# Patient Record
Sex: FEMALE | NOT HISPANIC OR LATINO | Employment: FULL TIME | URBAN - METROPOLITAN AREA
[De-identification: names, ages, dates, MRNs, and addresses within clinical notes are randomized per-mention and may not be internally consistent; named-entity substitution may affect disease eponyms.]

---

## 2024-03-11 ENCOUNTER — APPOINTMENT (OUTPATIENT)
Dept: OBSTETRICS AND GYNECOLOGY | Facility: CLINIC | Age: 24
End: 2024-03-11
Payer: COMMERCIAL

## 2024-03-18 ENCOUNTER — APPOINTMENT (OUTPATIENT)
Dept: OBSTETRICS AND GYNECOLOGY | Facility: CLINIC | Age: 24
End: 2024-03-18
Payer: COMMERCIAL

## 2024-04-01 ENCOUNTER — OFFICE VISIT (OUTPATIENT)
Dept: OBSTETRICS AND GYNECOLOGY | Facility: CLINIC | Age: 24
End: 2024-04-01
Payer: COMMERCIAL

## 2024-04-01 VITALS — WEIGHT: 186.7 LBS | DIASTOLIC BLOOD PRESSURE: 84 MMHG | SYSTOLIC BLOOD PRESSURE: 132 MMHG

## 2024-04-01 DIAGNOSIS — Z01.419 ENCOUNTER FOR ANNUAL ROUTINE GYNECOLOGICAL EXAMINATION: Primary | ICD-10-CM

## 2024-04-01 PROCEDURE — 99385 PREV VISIT NEW AGE 18-39: CPT

## 2024-04-01 PROCEDURE — 1036F TOBACCO NON-USER: CPT

## 2024-04-01 PROCEDURE — 88175 CYTOPATH C/V AUTO FLUID REDO: CPT | Mod: TC,GCY,WESLAB

## 2024-04-01 ASSESSMENT — ENCOUNTER SYMPTOMS
LOSS OF SENSATION IN FEET: 0
CHILLS: 0
OCCASIONAL FEELINGS OF UNSTEADINESS: 0
DYSURIA: 0
VOMITING: 0
ABDOMINAL PAIN: 0
DEPRESSION: 0
UNEXPECTED WEIGHT CHANGE: 0
DIZZINESS: 0
NAUSEA: 0
FEVER: 0
SHORTNESS OF BREATH: 0
FATIGUE: 0
HEADACHES: 0
COLOR CHANGE: 0
COUGH: 0

## 2024-04-01 ASSESSMENT — PATIENT HEALTH QUESTIONNAIRE - PHQ9
2. FEELING DOWN, DEPRESSED OR HOPELESS: NOT AT ALL
SUM OF ALL RESPONSES TO PHQ9 QUESTIONS 1 & 2: 0
1. LITTLE INTEREST OR PLEASURE IN DOING THINGS: NOT AT ALL

## 2024-04-01 ASSESSMENT — PAIN SCALES - GENERAL: PAINLEVEL: 0-NO PAIN

## 2024-04-01 NOTE — PROGRESS NOTES
Subjective   Aida Yeung is a 23 y.o. female who is here for a routine GYN exam as a new patient. Prior care at [facility]. Overall doing well; no concerns or complaints. Menses currently regular, once monthly, 6-7 days duration, first 1-2 days heavier, with cramps and sometimes breast tenderness, but manageable. Denies breast or vaginal concerns today.    Complaints:   none  Periods: regular  Dysmenorrhea:  none    Current contraception: condoms  History of abnormal Pap smear: no  History of abnormal mammogram: no      OB History          0    Para   0    Term   0       0    AB   0    Living   0         SAB   0    IAB   0    Ectopic   0    Multiple   0    Live Births   0                  Review of Systems   Constitutional:  Negative for chills, fatigue, fever and unexpected weight change.   Respiratory:  Negative for cough and shortness of breath.    Gastrointestinal:  Negative for abdominal pain, nausea and vomiting.   Genitourinary:  Negative for dyspareunia, dysuria, pelvic pain and vaginal discharge.   Skin:  Negative for color change and rash.   Neurological:  Negative for dizziness and headaches.       Objective   /84   Wt 84.7 kg (186 lb 11.2 oz)   LMP 2024        General:   Alert and oriented, in no acute distress   Neck: Supple. No visible thyromegaly.    Breast/Axilla: Normal to palpation bilaterally without masses, skin changes, or nipple discharge.    Abdomen: Soft, non-tender, without masses or organomegaly   Vulva: Normal architecture without erythema, masses, or lesions.    Vagina: Normal mucosa without lesions, masses, or atrophy. No abnormal vaginal discharge.    Cervix: Normal without masses, lesions, or signs of cervicitis; pap performed    Uterus: Normal, mobile, non-enlarged uterus   Adnexa: Normal without masses or lesions   Pelvic Floor Normal    Psych Normal affect. Normal mood.      Assessment/Plan   -Due for pap smear as a new patient, obtained.  -Continue  monitoring and tracking menstrual cycles.  -Rvwd safe sex practices.     Annamaria Dawkins PA-C

## 2024-04-10 ENCOUNTER — OFFICE VISIT (OUTPATIENT)
Dept: DERMATOLOGY | Facility: CLINIC | Age: 24
End: 2024-04-10
Payer: COMMERCIAL

## 2024-04-10 DIAGNOSIS — R20.9 DISTURBANCE OF SKIN SENSATION: ICD-10-CM

## 2024-04-10 DIAGNOSIS — L73.9 FOLLICULITIS: ICD-10-CM

## 2024-04-10 DIAGNOSIS — L91.0 HYPERTROPHIC SCAR: ICD-10-CM

## 2024-04-10 DIAGNOSIS — D22.9 MULTIPLE BENIGN MELANOCYTIC NEVI: Primary | ICD-10-CM

## 2024-04-10 PROCEDURE — 11900 INJECT SKIN LESIONS </W 7: CPT

## 2024-04-10 PROCEDURE — 99203 OFFICE O/P NEW LOW 30 MIN: CPT | Performed by: DERMATOLOGY

## 2024-04-10 PROCEDURE — 1036F TOBACCO NON-USER: CPT | Performed by: DERMATOLOGY

## 2024-04-10 ASSESSMENT — DERMATOLOGY PATIENT ASSESSMENT
ARE YOU ON BIRTH CONTROL: NO
ARE YOU TRYING TO GET PREGNANT: NO
DO YOU USE A TANNING BED: NO
DO YOU HAVE ANY NEW OR CHANGING LESIONS: NO
DO YOU HAVE IRREGULAR MENSTRUAL CYCLES: YES
HAVE YOU HAD OR DO YOU HAVE VASCULAR DISEASE: NO
DO YOU USE SUNSCREEN: OCCASIONALLY
ARE YOU AN ORGAN TRANSPLANT RECIPIENT: NO
HAVE YOU HAD OR DO YOU HAVE A STAPH INFECTION: NO

## 2024-04-10 ASSESSMENT — DERMATOLOGY QUALITY OF LIFE (QOL) ASSESSMENT
RATE HOW EMOTIONALLY BOTHERED YOU ARE BY YOUR SKIN PROBLEM (FOR EXAMPLE, WORRY, EMBARRASSMENT, FRUSTRATION): 6 - ALWAYS BOTHERED
RATE HOW BOTHERED YOU ARE BY EFFECTS OF YOUR SKIN PROBLEMS ON YOUR ACTIVITIES (EG, GOING OUT, ACCOMPLISHING WHAT YOU WANT, WORK ACTIVITIES OR YOUR RELATIONSHIPS WITH OTHERS): 2
RATE HOW BOTHERED YOU ARE BY SYMPTOMS OF YOUR SKIN PROBLEM (EG, ITCHING, STINGING BURNING, HURTING OR SKIN IRRITATION): 0 - NEVER BOTHERED
RATE HOW EMOTIONALLY BOTHERED YOU ARE BY YOUR SKIN PROBLEM (FOR EXAMPLE, WORRY, EMBARRASSMENT, FRUSTRATION): 0 - NEVER BOTHERED
RATE HOW BOTHERED YOU ARE BY EFFECTS OF YOUR SKIN PROBLEMS ON YOUR ACTIVITIES (EG, GOING OUT, ACCOMPLISHING WHAT YOU WANT, WORK ACTIVITIES OR YOUR RELATIONSHIPS WITH OTHERS): 0 - NEVER BOTHERED
RATE HOW BOTHERED YOU ARE BY EFFECTS OF YOUR SKIN PROBLEMS ON YOUR ACTIVITIES (EG, GOING OUT, ACCOMPLISHING WHAT YOU WANT, WORK ACTIVITIES OR YOUR RELATIONSHIPS WITH OTHERS): 2
WHAT SINGLE SKIN CONDITION LISTED BELOW IS THE PATIENT ANSWERING THE QUALITY-OF-LIFE ASSESSMENT QUESTIONS ABOUT: ACNE
WHAT SINGLE SKIN CONDITION LISTED BELOW IS THE PATIENT ANSWERING THE QUALITY-OF-LIFE ASSESSMENT QUESTIONS ABOUT: ACNE
DATE THE QUALITY-OF-LIFE ASSESSMENT WAS COMPLETED: 66940
ARE THERE EXCLUSIONS OR EXCEPTIONS FOR THE QUALITY OF LIFE ASSESSMENT: NO
RATE HOW EMOTIONALLY BOTHERED YOU ARE BY YOUR SKIN PROBLEM (FOR EXAMPLE, WORRY, EMBARRASSMENT, FRUSTRATION): 6 - ALWAYS BOTHERED

## 2024-04-10 ASSESSMENT — ITCH NUMERIC RATING SCALE: HOW SEVERE IS YOUR ITCHING?: 0

## 2024-04-10 ASSESSMENT — PATIENT GLOBAL ASSESSMENT (PGA)
WHAT IS THE PGA: PATIENT GLOBAL ASSESSMENT:  2 - MILD
PATIENT GLOBAL ASSESSMENT: PATIENT GLOBAL ASSESSMENT:  1 - CLEAR

## 2024-04-10 NOTE — PROGRESS NOTES
Subjective     Aida Yeung is a 23 y.o. female who presents for the following: Skin Check. Patient denies personal or family history of skin cancer. She has two lesions of concern today one on her superior mid-chest and another on her central lower back. The lesion on her superior mid-chest has been present for several months. It appeared after she was traveling. She is interested in discussing treatment/removal options today. The lesion on her central lower back has been present for many years. She thinks it may be a mole and is worried that it will catch on her clothing.     Patient also reports painful red bumps on her chest and back.       Review of Systems:  No other skin or systemic complaints other than what is documented elsewhere in the note.    The following portions of the chart were reviewed this encounter and updated as appropriate:       Specialty Problems    None    Past Medical History:  Aida Yeung  has no past medical history on file.    Past Surgical History:  Aida Yeung  has no past surgical history on file.    Family History:  Patient family history includes Diabetes in her father; Hypertension in her mother.    Social History:  Aida Yeung  reports that she has never smoked. She has never used smokeless tobacco. She reports current alcohol use. She reports that she does not use drugs.    Allergies:  Patient has no known allergies.    Current Medications / CAM's:  No current outpatient medications on file.  No current facility-administered medications for this visit.     Objective   Well appearing patient in no apparent distress; mood and affect are within normal limits.    A full examination was performed including scalp, head, eyes, ears, nose, lips, neck, chest, axillae, abdomen, back, buttocks, bilateral upper extremities, bilateral lower extremities, hands, feet, fingers, toes, fingernails, and toenails. All findings within normal limits unless otherwise noted below. Patient  declined genital and gluteal cleft exam.     - scattered regular brown macules and papules  - on the central lower back there is a brown pigmented papule    Chest (Upper Torso, Anterior), Torso - Posterior (Back)  Scattered on the patient's chest and back, there are several follicular-based erythematous, inflammatory papules and pustules      Chest - Medial (Center)  On the central upper chest, there is a erythematous papule.       Assessment/Plan   Multiple benign melanocytic nevi    Benign melanocytic nevi  - Discussed benign nature and that no treatment is necessary unless it becomes painful or increases in size. Patient opts for clinical monitoring at this time.    - Sun protective behavior reviewed and encouraged including the use of over-the-counter sunscreen with SPF30+ daily (reapply every 1.5 hours when outdoors), UPF clothing, broad rimmed hats, sunglasses, and avoidance of midday sun. Home skin monitoring encouraged and how to monitor for skin cancer (changing or new moles, new rapidly growing or non-healing lesions) reviewed. Patient encouraged to call with interval concerns or changes.      Folliculitis  Chest (Upper Torso, Anterior); Torso - Posterior (Back)    Folliculitis/acne with mild scarring, chest and back  -The bacterial nature of this condition and treatment options were discussed.  -Start OTC BPO 10% wash daily as needed. Advised patient to let sit on skin for 5-10 minutes before washing.   -Provided written recommendation for OTC BPO  - consider adding clindamycin in future if worsens/persists-- this seems to be an acute issue from recent travel to [place], so may calm with time and benzoyl peroxide         Related Procedures  Follow Up In Dermatology - Established Patient    Hypertrophic scar  Chest - Medial (Center)    -Likely 2/2 acne scarring.   -Discussed treatment options including monitoring vs ILK.   -After discussion of risks and benefits, patient opts for ILK.       Intralesional  injection - Chest - Medial (Center)  Intralesional Injection:   Date/Time: 4/10/2024 2:32 PM    Consent:     Consent obtained:  Verbal    Consent given by:  Patient    Risks discussed:  Poor cosmetic result, pain, infection and bleeding    Alternatives discussed:  No treatment  Pre-Procedure Details:     Prep Type:  Isopropyl alcohol  Procedure Details:   Injection:  Triamcinolone  Outcome: patient tolerated procedure well  Post-procedure details: sterile dressing applied and wound care instructions given  Comments:  A total of 0.5 ml of 10 mg/mL Kenalog were injected into 1 lesion(s)  Lot #: [id number]  Expiration: 12/2025    Related Medications  triamcinolone acetonide (Kenalog) injection 5 mg      Disturbance of skin sensation       RTC in 8 weeks for re-check of hypertrophic scarring/repeat ILK and 1-2 years for FBSE.     Gayle Miranda DO

## 2024-04-11 LAB
CYTOLOGY CMNT CVX/VAG CYTO-IMP: NORMAL
LAB AP HPV GENOTYPE QUESTION: YES
LAB AP HPV HR: NORMAL
LABORATORY COMMENT REPORT: NORMAL
LMP START DATE: NORMAL
PATH REPORT.TOTAL CANCER: NORMAL

## 2024-06-13 ASSESSMENT — DERMATOLOGY QUALITY OF LIFE (QOL) ASSESSMENT
RATE HOW BOTHERED YOU ARE BY EFFECTS OF YOUR SKIN PROBLEMS ON YOUR ACTIVITIES (EG, GOING OUT, ACCOMPLISHING WHAT YOU WANT, WORK ACTIVITIES OR YOUR RELATIONSHIPS WITH OTHERS): 4
RATE HOW EMOTIONALLY BOTHERED YOU ARE BY YOUR SKIN PROBLEM (FOR EXAMPLE, WORRY, EMBARRASSMENT, FRUSTRATION): 6 - ALWAYS BOTHERED
RATE HOW BOTHERED YOU ARE BY SYMPTOMS OF YOUR SKIN PROBLEM (EG, ITCHING, STINGING BURNING, HURTING OR SKIN IRRITATION): 2
RATE HOW BOTHERED YOU ARE BY SYMPTOMS OF YOUR SKIN PROBLEM (EG, ITCHING, STINGING BURNING, HURTING OR SKIN IRRITATION): 2
RATE HOW BOTHERED YOU ARE BY EFFECTS OF YOUR SKIN PROBLEMS ON YOUR ACTIVITIES (EG, GOING OUT, ACCOMPLISHING WHAT YOU WANT, WORK ACTIVITIES OR YOUR RELATIONSHIPS WITH OTHERS): 4
RATE HOW EMOTIONALLY BOTHERED YOU ARE BY YOUR SKIN PROBLEM (FOR EXAMPLE, WORRY, EMBARRASSMENT, FRUSTRATION): 6 - ALWAYS BOTHERED

## 2024-06-13 ASSESSMENT — PATIENT GLOBAL ASSESSMENT (PGA): WHAT IS THE PGA: PATIENT GLOBAL ASSESSMENT:  3 - MODERATE

## 2024-06-14 ENCOUNTER — APPOINTMENT (OUTPATIENT)
Dept: DERMATOLOGY | Facility: CLINIC | Age: 24
End: 2024-06-14
Payer: COMMERCIAL

## 2024-06-14 DIAGNOSIS — L91.0 HYPERTROPHIC SCAR: ICD-10-CM

## 2024-06-14 DIAGNOSIS — L70.0 ACNE VULGARIS: Primary | ICD-10-CM

## 2024-06-14 DIAGNOSIS — L73.9 FOLLICULITIS: ICD-10-CM

## 2024-06-14 PROCEDURE — 99214 OFFICE O/P EST MOD 30 MIN: CPT | Performed by: DERMATOLOGY

## 2024-06-14 RX ORDER — CLINDAMYCIN PHOSPHATE 10 MG/ML
SOLUTION TOPICAL
Qty: 60 EACH | Refills: 11 | Status: SHIPPED | OUTPATIENT
Start: 2024-06-14

## 2024-06-14 NOTE — PROGRESS NOTES
Subjective     Aida Yeung is a 23 y.o. female who presents for the following: Acne.     LOV 4/2024 started BPO 10% wash; has a hyptertrophic scar on chest as well that we did one round of ILK last visit.     Skin Cancer History  No skin cancer on file.      Review of Systems:  No other skin or systemic complaints other than what is documented elsewhere in the note.    The following portions of the chart were reviewed this encounter and updated as appropriate:       Specialty Problems    None    Past Medical History:  Aida Yeung  has no past medical history on file.    Past Surgical History:  Aida Yeung  has no past surgical history on file.    Family History:  Patient family history includes Diabetes in her father; Hypertension in her mother.    Social History:  Aida Yeung  reports that she has never smoked. She has never used smokeless tobacco. She reports current alcohol use. She reports that she does not use drugs.    Allergies:  Patient has no known allergies.    Current Medications / CAM's:    Current Outpatient Medications:     clindamycin (Cleocin T) 1 % swab, Apply to breakout prone areas daily after washing with benzoyl peroxide, Disp: 60 each, Rfl: 11     Objective   Well appearing patient in no apparent distress; mood and affect are within normal limits.    A focused exam:     Chest - Medial (Center), Head - Anterior (Face), Left Breast, Left Upper Back, Neck - Posterior, Right Breast, Right Upper Back  Scattered small red papules on chest, upper back/shoulders, face    Chest - Medial (Center)  4mm red soft papule mid chest         Assessment/Plan   Acne vulgaris  Head - Anterior (Face); Neck - Posterior; Chest - Medial (Center); Left Breast; Right Breast; Left Upper Back; Right Upper Back    Acne- improved on benzoyl peroxide 10% otc past 2  months, but still with some breakouts/not at treatment goal  - ADD clindamycin 1%, her insurance prefers swabs. Apply to breakout prone areas daily  after washing with benzoyl peroxide     Related Medications  clindamycin (Cleocin T) 1 % swab  Apply to breakout prone areas daily after washing with benzoyl peroxide    Folliculitis    Related Procedures  Follow Up In Dermatology - Established Patient    Hypertrophic scar  Chest - Medial (Center)    Hypertrophic scar mid chest- improved after ilk 10mg/mL at last visit- soft today, but erythematous.   - bothered by redness. She notes that she'll be outdoors/traveling this summer so will defer vbeam for the summer and return in Oct/Nov when she feels she'll be able to do strict pre/post VBEAM UV protection.       Related Procedures  Follow Up In Dermatology - Established Patient          Fuv 4-5 mos for vbeam/ilk scar on chest and acne check-up; 15min ok    Sharlene Haq MD

## 2024-11-06 ENCOUNTER — APPOINTMENT (OUTPATIENT)
Dept: DERMATOLOGY | Facility: CLINIC | Age: 24
End: 2024-11-06
Payer: COMMERCIAL

## 2024-11-06 DIAGNOSIS — R20.9 DISTURBANCE OF SKIN SENSATION: Primary | ICD-10-CM

## 2024-11-06 DIAGNOSIS — L91.0 HYPERTROPHIC SCAR: ICD-10-CM

## 2024-11-06 PROCEDURE — 11900 INJECT SKIN LESIONS </W 7: CPT

## 2024-11-06 PROCEDURE — 17110 DESTRUCTION B9 LES UP TO 14: CPT

## 2024-11-06 PROCEDURE — 1036F TOBACCO NON-USER: CPT | Performed by: DERMATOLOGY

## 2024-11-06 RX ORDER — TRIAMCINOLONE ACETONIDE 40 MG/ML
20 INJECTION, SUSPENSION INTRA-ARTICULAR; INTRAMUSCULAR ONCE
Status: COMPLETED | OUTPATIENT
Start: 2024-11-06 | End: 2024-11-06

## 2024-11-06 ASSESSMENT — DERMATOLOGY PATIENT ASSESSMENT
ARE YOU TRYING TO GET PREGNANT: NO
HAVE YOU HAD OR DO YOU HAVE A STAPH INFECTION: NO
DO YOU HAVE ANY NEW OR CHANGING LESIONS: NO
DO YOU USE SUNSCREEN: OCCASIONALLY
HAVE YOU HAD OR DO YOU HAVE VASCULAR DISEASE: NO
DO YOU USE A TANNING BED: NO
ARE YOU ON BIRTH CONTROL: NO
DO YOU HAVE IRREGULAR MENSTRUAL CYCLES: NO
ARE YOU AN ORGAN TRANSPLANT RECIPIENT: NO

## 2024-11-06 ASSESSMENT — DERMATOLOGY QUALITY OF LIFE (QOL) ASSESSMENT
RATE HOW BOTHERED YOU ARE BY EFFECTS OF YOUR SKIN PROBLEMS ON YOUR ACTIVITIES (EG, GOING OUT, ACCOMPLISHING WHAT YOU WANT, WORK ACTIVITIES OR YOUR RELATIONSHIPS WITH OTHERS): 1
RATE HOW EMOTIONALLY BOTHERED YOU ARE BY YOUR SKIN PROBLEM (FOR EXAMPLE, WORRY, EMBARRASSMENT, FRUSTRATION): 3
ARE THERE EXCLUSIONS OR EXCEPTIONS FOR THE QUALITY OF LIFE ASSESSMENT: NO
DATE THE QUALITY-OF-LIFE ASSESSMENT WAS COMPLETED: 67150
RATE HOW BOTHERED YOU ARE BY SYMPTOMS OF YOUR SKIN PROBLEM (EG, ITCHING, STINGING BURNING, HURTING OR SKIN IRRITATION): 0 - NEVER BOTHERED
WHAT SINGLE SKIN CONDITION LISTED BELOW IS THE PATIENT ANSWERING THE QUALITY-OF-LIFE ASSESSMENT QUESTIONS ABOUT: KELOIDS

## 2024-11-06 ASSESSMENT — PATIENT GLOBAL ASSESSMENT (PGA): PATIENT GLOBAL ASSESSMENT: PATIENT GLOBAL ASSESSMENT:  1 - CLEAR

## 2024-11-06 ASSESSMENT — ITCH NUMERIC RATING SCALE: HOW SEVERE IS YOUR ITCHING?: 0

## 2024-11-06 NOTE — PROGRESS NOTES
Subjective     Aida Yeung is a 24 y.o. female who presents for the following: Scar (Hypertrophic scar on chest. ILK treatment.). She states she is still bothered by the appearance of the scar in terms of redness and texture.    Skin Cancer History  No skin cancer on file.      Review of Systems:  No other skin or systemic complaints other than what is documented elsewhere in the note.    The following portions of the chart were reviewed this encounter and updated as appropriate:       Specialty Problems    None    Past Medical History:  Aida Yeung  has no past medical history on file.    Past Surgical History:  iAda Yeung  has no past surgical history on file.    Family History:  Patient family history includes Diabetes in her father; Hypertension in her mother.    Social History:  Aida Yeung  reports that she has never smoked. She has never used smokeless tobacco. She reports current alcohol use. She reports that she does not use drugs.    Allergies:  Patient has no known allergies.    Current Medications / CAM's:    Current Outpatient Medications:     clindamycin (Cleocin T) 1 % swab, Apply to breakout prone areas daily after washing with benzoyl peroxide, Disp: 60 each, Rfl: 11  No current facility-administered medications for this visit.     Objective   Well appearing patient in no apparent distress; mood and affect are within normal limits.    A focused examination was performed of the chest. All findings within normal limits unless otherwise noted below.    Chest - Medial (Center)  4mm red soft papule mid chest         Assessment/Plan   Disturbance of skin sensation    Hypertrophic scar  Chest - Medial (Center)    Hypertrophic scar mid chest- improved after ilk 10mg/mL at 4/10/24 visit- soft today, but erythematous.   - bothered by redness and texture, discussed risk and benefits of VBEAM including treatment of surrounding telangiectasia. Advised need for treatment with intralesional steriod  injection incombination with the VBEAM to treat the lesion. Patient is agreeable and has signed paper VBEAM consent- reviewed in detail   -Counseled regarding risks and benefits of intralesional steroid injection following VBEAM, including skin atrophy, infection, bleeding, and poor cosmetic result  - reviewed the nature of telangiectasias and that the VBEAM laser targets the broken blood vessels and background redness of the scar. Reviewed that more than one treatment may be needed and background redness may not completely resolve with treatment.   - Risks of vascular laser treatment include but not limited to pain, redness, swelling, bruising, ulceration, scar, and dyspigmentation.   - Post-treatment: strictly avoid sunlight until healed.     Reviewed that we may be getting to close to as good as possible with the treatment option I have (vbeam, ilk); consider outsider referral for resurfacing or other scar revision pending her course and wishes.     Intralesional injection - Chest - Medial (Center)  Intralesional Injection:   Consent:     Consent obtained:  Verbal    Consent given by:  Patient    Risks discussed:  Poor cosmetic result, pain, infection and bleeding    Alternatives discussed:  No treatment  Pre-Procedure Details:     Prep Type:  Isopropyl alcohol  Procedure Details:   Injection:  Triamcinolone  Outcome: patient tolerated procedure well  Post-procedure details: sterile dressing applied and wound care instructions given  Dressing type: bandage   Comments:  A total of 0.05 ml of 20 mg/mL Kenalog were injected into 1 lesion(s)  Lot #: [id number]  Expiration: 4/1/26    Destr of lesion - Chest - Medial (Center)    Destruction method: laser removal    Timeout:  patient name, date of birth, surgical site, and procedure verified  Procedure prep:  Patient prepped in sterile fashion  Eye shield: adhesive eye shield    Laser type:  Vbeam  Treatment number:  1  Fluence (J/sq cm):  7  Spot size (mm):  7  Pulse  duration (ms):  1.5  Pulse stacking: Yes    Dynamic cooling: Yes    Lesion length (cm):  0.4  Lesion width (cm):  0.4  Outcome: patient tolerated procedure well with no complications      Related Procedures  Follow Up In Dermatology - Established Patient  Follow Up In Dermatology - Established Patient    Related Medications  triamcinolone acetonide (Kenalog-40) injection 20 mg         Return in 2-3 months for follow up, potential repeat V-Beam treatment/ILK injection.    Yamile Collins MD  Department of Dermatology    I was present for the entirety of the procedure(s).     Sharlene Haq MD

## 2024-12-20 ENCOUNTER — APPOINTMENT (OUTPATIENT)
Dept: OBSTETRICS AND GYNECOLOGY | Facility: CLINIC | Age: 24
End: 2024-12-20
Payer: COMMERCIAL

## 2024-12-20 VITALS
HEIGHT: 66 IN | SYSTOLIC BLOOD PRESSURE: 112 MMHG | WEIGHT: 199 LBS | BODY MASS INDEX: 31.98 KG/M2 | DIASTOLIC BLOOD PRESSURE: 64 MMHG

## 2024-12-20 DIAGNOSIS — Z01.419 WELL WOMAN EXAM WITH ROUTINE GYNECOLOGICAL EXAM: ICD-10-CM

## 2024-12-20 DIAGNOSIS — R10.2 PELVIC PAIN: ICD-10-CM

## 2024-12-20 DIAGNOSIS — N92.6 IRREGULAR PERIODS: ICD-10-CM

## 2024-12-20 LAB
POC APPEARANCE, URINE: CLEAR
POC BILIRUBIN, URINE: NEGATIVE
POC BLOOD, URINE: NEGATIVE
POC COLOR, URINE: YELLOW
POC GLUCOSE, URINE: NEGATIVE MG/DL
POC KETONES, URINE: NEGATIVE MG/DL
POC LEUKOCYTES, URINE: NEGATIVE
POC NITRITE,URINE: NEGATIVE
POC PH, URINE: 5 PH
POC PROTEIN, URINE: NEGATIVE MG/DL
POC SPECIFIC GRAVITY, URINE: 1.02
POC UROBILINOGEN, URINE: 0.2 EU/DL

## 2024-12-20 PROCEDURE — 1036F TOBACCO NON-USER: CPT | Performed by: OBSTETRICS & GYNECOLOGY

## 2024-12-20 PROCEDURE — 81003 URINALYSIS AUTO W/O SCOPE: CPT | Performed by: OBSTETRICS & GYNECOLOGY

## 2024-12-20 PROCEDURE — 99395 PREV VISIT EST AGE 18-39: CPT | Performed by: OBSTETRICS & GYNECOLOGY

## 2024-12-20 PROCEDURE — 3008F BODY MASS INDEX DOCD: CPT | Performed by: OBSTETRICS & GYNECOLOGY

## 2024-12-20 PROCEDURE — 87086 URINE CULTURE/COLONY COUNT: CPT

## 2024-12-20 ASSESSMENT — PAIN SCALES - GENERAL: PAINLEVEL_OUTOF10: 0-NO PAIN

## 2024-12-20 NOTE — PROGRESS NOTES
Subjective   Patient ID: Aida Yeung is a 24 y.o. female who presents for Well Women Visit (New Patient //Annual exam with pap smear //C/o  irregular periods//Chaperone declined).  HPI  As contained in the chief complaint  Review of Systems  10 systems have been reviewed and are negative and noncontributory to the patient current ailments.    Objective   Physical Exam  Vital signs reviewed    CONSTITUTIONAL- well nourished, well developed, looks like stated age.  She is sitting comfortably on the exam table in no apparent distress  SKIN- normal skin color and pigmentation no visible lesions  EYES- normal external exam  THYROID- symmetrical ,normal size and normal consistency  HEART- RRR without murmur, S3 or S4.  LUNGS- breathing comfortably, no dyspnea  EXTREMITIES- no deformities.  Edema, discoloration, or pain in BLE  NEUROLOGICAL- A/Ox3, conversational   PSYCHIATRIC- alert, pleasant and cordial, age-appropriate, not anxious, or depressed appearing  BREASTS-normal appearance, no skin changes or nipple discharge.  Palpation of the breast and axillae: No palpable mass and no axillary lymphadenopathy  ABDOMEN- soft, non distended, bowel sound normal pitch and intensity,no palpable abnormal masses  GENITOURINARY- External genitalia: Normal.                                 - Uterus: AV/AF NSSC, mobile and nontender                                -The adnexal areas are free of tenderness or mass                                -There are no cervical lesions; there is no cervical motion tenderness                                -Vagina without lesions, mucosa pink and well-hydrated, discharge is physiologic.                                   Inspection of Perianal Area: Normal    Assessment/Plan   Diagnoses and all orders for this visit:  Well woman exam with routine gynecological exam  -     THINPREP PAP TEST  Irregular periods  -We discussed the use of a combination birth control control trial [place] is not in favor  of hormonal treatment at this time.  Going forward she will continue to monitor this cycle frequency and notify the office if her irregular bleeding recurs         Vladimir Winkler,  12/20/24 1:48 PM

## 2024-12-22 LAB — BACTERIA UR CULT: NO GROWTH

## 2025-01-07 LAB
CYTOLOGY CMNT CVX/VAG CYTO-IMP: NORMAL
LAB AP HPV GENOTYPE QUESTION: YES
LAB AP HPV HR: NORMAL
LABORATORY COMMENT REPORT: NORMAL
LMP START DATE: NORMAL
MENSTRUAL HX REPORTED: NORMAL
PATH REPORT.TOTAL CANCER: NORMAL

## 2025-02-28 ENCOUNTER — APPOINTMENT (OUTPATIENT)
Dept: DERMATOLOGY | Facility: CLINIC | Age: 25
End: 2025-02-28
Payer: COMMERCIAL

## 2025-02-28 DIAGNOSIS — L91.0 HYPERTROPHIC SCAR: ICD-10-CM

## 2025-02-28 DIAGNOSIS — T69.1XXA CHILBLAINS, INITIAL ENCOUNTER: ICD-10-CM

## 2025-02-28 DIAGNOSIS — L70.0 ACNE VULGARIS: Primary | ICD-10-CM

## 2025-02-28 PROCEDURE — 99214 OFFICE O/P EST MOD 30 MIN: CPT | Performed by: DERMATOLOGY

## 2025-02-28 PROCEDURE — 1036F TOBACCO NON-USER: CPT | Performed by: DERMATOLOGY

## 2025-02-28 RX ORDER — CLINDAMYCIN PHOSPHATE 10 UG/ML
LOTION TOPICAL
Qty: 60 ML | Refills: 11 | Status: SHIPPED | OUTPATIENT
Start: 2025-02-28

## 2025-02-28 RX ORDER — SPIRONOLACTONE 50 MG/1
TABLET, FILM COATED ORAL
Qty: 60 TABLET | Refills: 4 | Status: SHIPPED | OUTPATIENT
Start: 2025-02-28

## 2025-02-28 RX ORDER — TRETINOIN 0.25 MG/G
CREAM TOPICAL
Qty: 20 G | Refills: 3 | Status: SHIPPED | OUTPATIENT
Start: 2025-02-28

## 2025-02-28 RX ORDER — CLOBETASOL PROPIONATE 0.5 MG/G
CREAM TOPICAL
Qty: 15 G | Refills: 3 | Status: SHIPPED | OUTPATIENT
Start: 2025-02-28

## 2025-02-28 ASSESSMENT — ITCH NUMERIC RATING SCALE: HOW SEVERE IS YOUR ITCHING?: 0

## 2025-02-28 ASSESSMENT — PATIENT GLOBAL ASSESSMENT (PGA): PATIENT GLOBAL ASSESSMENT: PATIENT GLOBAL ASSESSMENT:  1 - CLEAR

## 2025-02-28 ASSESSMENT — DERMATOLOGY QUALITY OF LIFE (QOL) ASSESSMENT
WHAT SINGLE SKIN CONDITION LISTED BELOW IS THE PATIENT ANSWERING THE QUALITY-OF-LIFE ASSESSMENT QUESTIONS ABOUT: KELOIDS
DATE THE QUALITY-OF-LIFE ASSESSMENT WAS COMPLETED: 67264

## 2025-02-28 NOTE — PROGRESS NOTES
Subjective     Aida Yeung is a 24 y.o. female who presents for the following: Acne (Reports acne on face and body has not improved. Has been using benzoyl peroxide wash daily. ) and Keloid (Medial chest. ).     Skin Cancer History  No skin cancer on file.      Review of Systems:  No other skin or systemic complaints other than what is documented elsewhere in the note.    The following portions of the chart were reviewed this encounter and updated as appropriate:       Specialty Problems    None    Past Medical History:  Aida Yeung  has no past medical history on file.    Past Surgical History:  Aida Yeung  has a past surgical history that includes No past surgeries.    Family History:  Patient family history includes Diabetes in her father; Hypertension in her mother.    Social History:  Aida Yeung  reports that she has never smoked. She has never been exposed to tobacco smoke. She has never used smokeless tobacco. She reports current alcohol use. She reports that she does not use drugs.    Allergies:  Patient has no known allergies.    Current Medications / CAM's:    Current Outpatient Medications:     clindamycin (Cleocin T) 1 % lotion, Apply to breakout prone areas daily after washing with benzoyl peroxide, Disp: 60 mL, Rfl: 11    clobetasol (Temovate) 0.05 % cream, Apply twice daily to affected areas on toes, Disp: 15 g, Rfl: 3    spironolactone (Aldactone) 50 mg tablet, Take 1 pill daily for 2 weeks, if doing well increase to 2 pills each morning, Disp: 60 tablet, Rfl: 4    tretinoin (Retin-A) 0.025 % cream, Apply a small 1/2 pea sized amount to clean dry face at bedtime.  Start off 3x/week and increase as tolerated., Disp: 20 g, Rfl: 3     Objective   Well appearing patient in no apparent distress; mood and affect are within normal limits.    A focused exam of face, chest, shoulders, back    Chest - Medial (Center), Head - Anterior (Face), Left Breast, Left Upper Back, Neck - Posterior, Right  "Breast, Right Upper Back  Scattered small red papules and pustules with small white scars on chest, upper back/shoulders, face    Chest - Medial (Center)  Resolved     Left Foot - Anterior, Right Foot - Anterior  Few purplish papules on her dorsal toes on left > right          Assessment/Plan   Acne vulgaris  Head - Anterior (Face); Neck - Posterior; Chest - Medial (Center); Left Breast; Right Breast; Left Upper Back; Right Upper Back    Acne-  moderate papulopustular likely with hormonal component, scattered superficial scars  - has tried benzoyl peroxide 10% regularly since 6/2024, was using clindamycin swabs but the swabs were too drying so off of them past few months. Interested in additional treatments  - Restart clindamycin 1%, but swabs were drying. Will Rx as lotion instead. Use after benzoyl peroxide 10% wash (rinse after 1 min), and apply to ALL acne prone areas  - Start Tretinoin. Apply thin layer ot tretinoin 0.025% cream to face every other night, increase to nightly if not too dry. Avoid with pregnancy. Skip nights and use noncomedogenic moisturizer if too drying.  -Start spironolactone. Start 50mg daily for 2 weeks, if asymptomatic will increase to 100mg daily until follow-up. Risks including but not limited to irregular menses, breast tenderness, increased urination, increased potassium, muscle cramps, heart arrhythmia, lowered blood pressure reviewed.Discussed the possible risk of breast cancer and other hormonally related cancers, though this has not shown a significant increased risk in studies. Pregnancy avoidance should be practiced while on this medication; stop the medicine should pregnancy occur.  She will use condoms. In women over the age of 40 check baseline creatinine and potassium levels, and recheck at goal dose.    - briefly reviewed isotretinoin given her scarring, but she defers at this time due. Lots of room to uptitrate the spironolactone and tretinoin. She prefers \"meds with least " "side effects\"    Related Procedures  Follow Up In Dermatology - Established Patient    Related Medications  spironolactone (Aldactone) 50 mg tablet  Take 1 pill daily for 2 weeks, if doing well increase to 2 pills each morning    clindamycin (Cleocin T) 1 % lotion  Apply to breakout prone areas daily after washing with benzoyl peroxide    tretinoin (Retin-A) 0.025 % cream  Apply a small 1/2 pea sized amount to clean dry face at bedtime.  Start off 3x/week and increase as tolerated.    Hypertrophic scar  Chest - Medial (Center)    Hypertrophic scar mid chest-resolved s/p ilk and vbeam at LOV 11/2024    Testosterone,Free and Total - Chest - Medial (Center)    DHEA-Sulfate - Chest - Medial (Center)    TSH w/Reflex To Free T4 If Abnormal - Chest - Medial (Center)    Related Procedures  Follow Up In Dermatology - Established Patient    Chilblains, initial encounter  Left Foot - Anterior; Right Foot - Anterior    She notes prior biopsy proven \"frost bite\" that was treated with topicals in past. Today I reviewed favor chilblains that does flare in cold weather. Nature of disorder reviewed.     Recommend strict warming of feet until weather improves  Start clobetasol cream BID for 2-3 weeks.         Related Medications  clobetasol (Temovate) 0.05 % cream  Apply twice daily to affected areas on toes       3-4 mos acne, chilblains    Sharlene Haq MD      "

## 2025-06-04 ENCOUNTER — APPOINTMENT (OUTPATIENT)
Dept: DERMATOLOGY | Facility: CLINIC | Age: 25
End: 2025-06-04
Payer: COMMERCIAL

## 2025-06-29 DIAGNOSIS — L70.0 ACNE VULGARIS: ICD-10-CM

## 2025-06-30 ENCOUNTER — TELEPHONE (OUTPATIENT)
Dept: DERMATOLOGY | Facility: CLINIC | Age: 25
End: 2025-06-30
Payer: COMMERCIAL

## 2025-06-30 RX ORDER — SPIRONOLACTONE 50 MG/1
TABLET, FILM COATED ORAL
Qty: 60 TABLET | Refills: 0 | Status: SHIPPED | OUTPATIENT
Start: 2025-06-30

## 2025-06-30 NOTE — TELEPHONE ENCOUNTER
1 refill sent to your pharmacy  for Spironlactone. Please schedule a follow up appointment. You re to return in 3-4 months for follow up appt from Feb appointment